# Patient Record
Sex: MALE | Race: BLACK OR AFRICAN AMERICAN | URBAN - METROPOLITAN AREA
[De-identification: names, ages, dates, MRNs, and addresses within clinical notes are randomized per-mention and may not be internally consistent; named-entity substitution may affect disease eponyms.]

---

## 2017-07-25 ENCOUNTER — HISTORICAL (OUTPATIENT)
Dept: ADMINISTRATIVE | Facility: HOSPITAL | Age: 57
End: 2017-07-25

## 2017-07-31 LAB
FINAL CULTURE: NORMAL
FINAL CULTURE: NORMAL

## 2017-10-29 LAB
COLOR STL: NORMAL
CONSISTENCY STL: NORMAL
HEMOCCULT SP1 STL QL: NEGATIVE

## 2017-10-30 LAB
COLOR STL: NORMAL
CONSISTENCY STL: NORMAL
HEMOCCULT SP2 STL QL: NEGATIVE

## 2017-10-31 LAB
COLOR STL: NORMAL
CONSISTENCY STL: NORMAL

## 2017-11-02 ENCOUNTER — HISTORICAL (OUTPATIENT)
Dept: INTERNAL MEDICINE | Facility: CLINIC | Age: 57
End: 2017-11-02

## 2018-05-21 ENCOUNTER — HISTORICAL (OUTPATIENT)
Dept: INTERNAL MEDICINE | Facility: CLINIC | Age: 58
End: 2018-05-21

## 2018-05-21 LAB
CHOLEST SERPL-MCNC: 189 MG/DL
CHOLEST/HDLC SERPL: 4 {RATIO} (ref 0–5)
EST. AVERAGE GLUCOSE BLD GHB EST-MCNC: 105 MG/DL
HBA1C MFR BLD: 5.3 % (ref 4.2–6.3)
HDLC SERPL-MCNC: 47 MG/DL
LDLC SERPL CALC-MCNC: 109 MG/DL (ref 0–130)
TRIGL SERPL-MCNC: 164 MG/DL
VLDLC SERPL CALC-MCNC: 33 MG/DL

## 2018-07-19 ENCOUNTER — HISTORICAL (OUTPATIENT)
Dept: INTERNAL MEDICINE | Facility: CLINIC | Age: 58
End: 2018-07-19

## 2018-07-27 ENCOUNTER — HISTORICAL (OUTPATIENT)
Dept: RADIOLOGY | Facility: HOSPITAL | Age: 58
End: 2018-07-27

## 2018-11-13 ENCOUNTER — HISTORICAL (OUTPATIENT)
Dept: ADMINISTRATIVE | Facility: HOSPITAL | Age: 58
End: 2018-11-13

## 2018-11-13 LAB
ALBUMIN SERPL-MCNC: 4 GM/DL (ref 3.4–5)
ALBUMIN/GLOB SERPL: 1 RATIO (ref 1–2)
ALP SERPL-CCNC: 135 UNIT/L (ref 45–117)
ALT SERPL-CCNC: 60 UNIT/L (ref 12–78)
AST SERPL-CCNC: 43 UNIT/L (ref 15–37)
BILIRUB SERPL-MCNC: 0.6 MG/DL (ref 0.2–1)
BILIRUBIN DIRECT+TOT PNL SERPL-MCNC: 0.2 MG/DL
BILIRUBIN DIRECT+TOT PNL SERPL-MCNC: 0.4 MG/DL
BUN SERPL-MCNC: 10 MG/DL (ref 7–18)
CALCIUM SERPL-MCNC: 8.9 MG/DL (ref 8.5–10.1)
CHLORIDE SERPL-SCNC: 106 MMOL/L (ref 98–107)
CHOLEST SERPL-MCNC: 180 MG/DL
CHOLEST/HDLC SERPL: 3.5 {RATIO} (ref 0–5)
CO2 SERPL-SCNC: 24 MMOL/L (ref 21–32)
CREAT SERPL-MCNC: 0.7 MG/DL (ref 0.6–1.3)
FERRITIN SERPL-MCNC: 1083.8 NG/ML (ref 26–388)
GLOBULIN SER-MCNC: 4.8 GM/ML (ref 2.3–3.5)
GLUCOSE SERPL-MCNC: 80 MG/DL (ref 74–106)
HDLC SERPL-MCNC: 52 MG/DL
INR PPP: 1.19 (ref 0.9–1.2)
IRON SATN MFR SERPL: 45.5 % (ref 15–50)
IRON SERPL-MCNC: 130 MCG/DL (ref 65–175)
LDLC SERPL CALC-MCNC: 107 MG/DL (ref 0–130)
POTASSIUM SERPL-SCNC: 4.1 MMOL/L (ref 3.5–5.1)
PROT SERPL-MCNC: 8.8 GM/DL (ref 6.4–8.2)
PROTHROMBIN TIME: 14.3 SECOND(S) (ref 11.9–14.4)
SODIUM SERPL-SCNC: 136 MMOL/L (ref 136–145)
TIBC SERPL-MCNC: 286 MCG/DL (ref 250–450)
TRANSFERRIN SERPL-MCNC: 224 MG/DL (ref 200–360)
TRIGL SERPL-MCNC: 107 MG/DL
VLDLC SERPL CALC-MCNC: 21 MG/DL

## 2019-07-25 ENCOUNTER — HISTORICAL (OUTPATIENT)
Dept: ADMINISTRATIVE | Facility: HOSPITAL | Age: 59
End: 2019-07-25

## 2019-07-25 LAB
ABS NEUT (OLG): 5.08 X10(3)/MCL (ref 2.1–9.2)
ALBUMIN SERPL-MCNC: 4.1 GM/DL (ref 3.4–5)
ALBUMIN/GLOB SERPL: 1 RATIO (ref 1.1–2)
ALP SERPL-CCNC: 90 UNIT/L (ref 45–117)
ALT SERPL-CCNC: 32 UNIT/L (ref 12–78)
AST SERPL-CCNC: 32 UNIT/L (ref 15–37)
BASOPHILS # BLD AUTO: 0.05 X10(3)/MCL
BASOPHILS NFR BLD AUTO: 0 %
BILIRUB SERPL-MCNC: 0.6 MG/DL (ref 0.2–1)
BILIRUBIN DIRECT+TOT PNL SERPL-MCNC: 0.2 MG/DL
BILIRUBIN DIRECT+TOT PNL SERPL-MCNC: 0.4 MG/DL
BUN SERPL-MCNC: 12 MG/DL (ref 7–18)
CALCIUM SERPL-MCNC: 9.6 MG/DL (ref 8.5–10.1)
CHLORIDE SERPL-SCNC: 105 MMOL/L (ref 98–107)
CO2 SERPL-SCNC: 29 MMOL/L (ref 21–32)
CREAT SERPL-MCNC: 0.7 MG/DL (ref 0.6–1.3)
EOSINOPHIL # BLD AUTO: 0.41 10*3/UL
EOSINOPHIL NFR BLD AUTO: 4 %
ERYTHROCYTE [DISTWIDTH] IN BLOOD BY AUTOMATED COUNT: 15.9 % (ref 11.5–14.5)
GLOBULIN SER-MCNC: 4.3 GM/ML (ref 2.3–3.5)
GLUCOSE SERPL-MCNC: 82 MG/DL (ref 74–106)
HCT VFR BLD AUTO: 42.1 % (ref 40–51)
HGB BLD-MCNC: 13 GM/DL (ref 13.5–17.5)
IMM GRANULOCYTES # BLD AUTO: 0.05 10*3/UL
IMM GRANULOCYTES NFR BLD AUTO: 0 %
INR PPP: 1.22 (ref 0.9–1.2)
LYMPHOCYTES # BLD AUTO: 3.48 X10(3)/MCL
LYMPHOCYTES NFR BLD AUTO: 36 % (ref 13–40)
MCH RBC QN AUTO: 22.8 PG (ref 26–34)
MCHC RBC AUTO-ENTMCNC: 30.9 GM/DL (ref 31–37)
MCV RBC AUTO: 74 FL (ref 80–100)
MONOCYTES # BLD AUTO: 0.68 X10(3)/MCL
MONOCYTES NFR BLD AUTO: 7 % (ref 0–24)
NEUTROPHILS # BLD AUTO: 5.08 X10(3)/MCL
NEUTROPHILS NFR BLD AUTO: 52 X10(3)/MCL
PLATELET # BLD AUTO: 208 X10(3)/MCL (ref 130–400)
PMV BLD AUTO: 10.6 FL (ref 7.4–10.4)
POTASSIUM SERPL-SCNC: 4.4 MMOL/L (ref 3.5–5.1)
PROT SERPL-MCNC: 8.4 GM/DL (ref 6.4–8.2)
PROTHROMBIN TIME: 15.3 SECOND(S) (ref 11.9–14.4)
RBC # BLD AUTO: 5.69 X10(6)/MCL (ref 4.5–5.9)
SODIUM SERPL-SCNC: 137 MMOL/L (ref 136–145)
WBC # SPEC AUTO: 9.8 X10(3)/MCL (ref 4.5–11)

## 2019-08-15 ENCOUNTER — HISTORICAL (OUTPATIENT)
Dept: RADIOLOGY | Facility: HOSPITAL | Age: 59
End: 2019-08-15

## 2019-11-11 ENCOUNTER — HISTORICAL (OUTPATIENT)
Dept: ADMINISTRATIVE | Facility: HOSPITAL | Age: 59
End: 2019-11-11

## 2019-12-02 ENCOUNTER — HISTORICAL (OUTPATIENT)
Dept: RADIOLOGY | Facility: HOSPITAL | Age: 59
End: 2019-12-02

## 2019-12-17 ENCOUNTER — HISTORICAL (OUTPATIENT)
Dept: CARDIOLOGY | Facility: HOSPITAL | Age: 59
End: 2019-12-17

## 2020-03-02 ENCOUNTER — HISTORICAL (OUTPATIENT)
Dept: INTERNAL MEDICINE | Facility: CLINIC | Age: 60
End: 2020-03-02

## 2020-03-02 LAB
APPEARANCE, UA: CLEAR
BACTERIA #/AREA URNS AUTO: ABNORMAL /HPF
BILIRUB UR QL STRIP: NEGATIVE
BUN SERPL-MCNC: 6 MG/DL (ref 7–18)
CALCIUM SERPL-MCNC: 9.2 MG/DL (ref 8.5–10.1)
CHLORIDE SERPL-SCNC: 109 MMOL/L (ref 98–107)
CHOLEST SERPL-MCNC: 153 MG/DL
CHOLEST/HDLC SERPL: 1.9 {RATIO} (ref 0–5)
CO2 SERPL-SCNC: 28 MMOL/L (ref 21–32)
COLOR UR: YELLOW
CREAT SERPL-MCNC: 0.9 MG/DL (ref 0.6–1.3)
CREAT/UREA NIT SERPL: 7
GLUCOSE (UA): NEGATIVE
GLUCOSE SERPL-MCNC: 103 MG/DL (ref 74–106)
HDLC SERPL-MCNC: 80 MG/DL (ref 40–59)
HGB UR QL STRIP: 0.03 MG/DL
HYALINE CASTS #/AREA URNS LPF: ABNORMAL /LPF
KETONES UR QL STRIP: NEGATIVE
LDLC SERPL CALC-MCNC: 58 MG/DL
LEUKOCYTE ESTERASE UR QL STRIP: NEGATIVE
NITRITE UR QL STRIP: NEGATIVE
PH UR STRIP: 5.5 [PH] (ref 4.5–8)
POTASSIUM SERPL-SCNC: 4.1 MMOL/L (ref 3.5–5.1)
PROT UR QL STRIP: NEGATIVE
RBC #/AREA URNS AUTO: ABNORMAL /HPF
SODIUM SERPL-SCNC: 140 MMOL/L (ref 136–145)
SP GR UR STRIP: 1.02 (ref 1–1.03)
SQUAMOUS #/AREA URNS LPF: ABNORMAL /LPF
TRIGL SERPL-MCNC: 74 MG/DL
UROBILINOGEN UR STRIP-ACNC: NORMAL
VLDLC SERPL CALC-MCNC: 15 MG/DL
WBC #/AREA URNS AUTO: ABNORMAL /HPF

## 2020-05-22 ENCOUNTER — HISTORICAL (OUTPATIENT)
Dept: INTERNAL MEDICINE | Facility: CLINIC | Age: 60
End: 2020-05-22

## 2020-05-22 LAB — PSA SERPL-MCNC: <0.1 NG/ML

## 2020-07-10 ENCOUNTER — HISTORICAL (OUTPATIENT)
Dept: ADMINISTRATIVE | Facility: HOSPITAL | Age: 60
End: 2020-07-10

## 2020-07-10 LAB
APPEARANCE, UA: CLEAR
BACTERIA #/AREA URNS AUTO: ABNORMAL /HPF
BILIRUB UR QL STRIP: NEGATIVE
COLOR UR: YELLOW
GLUCOSE (UA): NEGATIVE
HGB UR QL STRIP: 0.03 MG/DL
HYALINE CASTS #/AREA URNS LPF: ABNORMAL /LPF
KETONES UR QL STRIP: NEGATIVE
LEUKOCYTE ESTERASE UR QL STRIP: NEGATIVE
NITRITE UR QL STRIP: NEGATIVE
PH UR STRIP: 6 [PH] (ref 4.5–8)
PROT UR QL STRIP: 10 MG/DL
RBC #/AREA URNS AUTO: ABNORMAL /HPF
SP GR UR STRIP: 1.01 (ref 1–1.03)
SQUAMOUS #/AREA URNS LPF: ABNORMAL /LPF
UROBILINOGEN UR STRIP-ACNC: 2 MG/DL
WBC #/AREA URNS AUTO: ABNORMAL /HPF

## 2020-08-17 ENCOUNTER — HISTORICAL (OUTPATIENT)
Dept: RADIOLOGY | Facility: HOSPITAL | Age: 60
End: 2020-08-17

## 2020-09-15 ENCOUNTER — HISTORICAL (OUTPATIENT)
Dept: INTERNAL MEDICINE | Facility: CLINIC | Age: 60
End: 2020-09-15

## 2020-09-15 LAB
APPEARANCE, UA: NORMAL
BACTERIA #/AREA URNS AUTO: ABNORMAL /HPF
BILIRUB UR QL STRIP: NEGATIVE
COLOR UR: NORMAL
GLUCOSE (UA): NEGATIVE
HGB UR QL STRIP: NEGATIVE
HIV 1+2 AB+HIV1 P24 AG SERPL QL IA: NONREACTIVE
HYALINE CASTS #/AREA URNS LPF: 0 /LPF
KETONES UR QL STRIP: NEGATIVE
LEUKOCYTE ESTERASE UR QL STRIP: NEGATIVE
MUCOUS THREADS URNS QL MICRO: SLIGHT
NITRITE UR QL STRIP: NEGATIVE
PH UR STRIP: 5.5 [PH] (ref 4.5–8)
PROT UR QL STRIP: NEGATIVE
RBC #/AREA URNS AUTO: ABNORMAL /HPF
RPR SER QL: REACTIVE
SP GR UR STRIP: >1.03 (ref 1–1.03)
SQUAMOUS #/AREA URNS LPF: ABNORMAL /LPF
T PALLIDUM AB SER QL: REACTIVE
UROBILINOGEN UR STRIP-ACNC: NORMAL
WBC #/AREA URNS AUTO: ABNORMAL /HPF

## 2021-01-27 ENCOUNTER — HISTORICAL (OUTPATIENT)
Dept: INTERNAL MEDICINE | Facility: CLINIC | Age: 61
End: 2021-01-27

## 2021-01-27 LAB
ABS NEUT (OLG): 2.78 X10(3)/MCL (ref 2.1–9.2)
ALBUMIN SERPL-MCNC: 4.5 GM/DL (ref 3.5–5)
ALBUMIN/GLOB SERPL: 1.2 RATIO (ref 1.1–2)
ALP SERPL-CCNC: 89 UNIT/L (ref 40–150)
ALT SERPL-CCNC: 45 UNIT/L (ref 0–55)
AMMONIA PLAS-MSCNC: 52.5 UMOL/L (ref 18–72)
AST SERPL-CCNC: 95 UNIT/L (ref 5–34)
BASOPHILS # BLD AUTO: 0 X10(3)/MCL (ref 0–0.2)
BASOPHILS NFR BLD AUTO: 1 %
BILIRUB SERPL-MCNC: 0.8 MG/DL
BILIRUBIN DIRECT+TOT PNL SERPL-MCNC: 0.4 MG/DL (ref 0–0.5)
BILIRUBIN DIRECT+TOT PNL SERPL-MCNC: 0.4 MG/DL (ref 0–0.8)
BUN SERPL-MCNC: 5 MG/DL (ref 8.4–25.7)
CALCIUM SERPL-MCNC: 9.7 MG/DL (ref 8.4–10.2)
CHLORIDE SERPL-SCNC: 104 MMOL/L (ref 98–107)
CO2 SERPL-SCNC: 23 MMOL/L (ref 22–29)
CREAT SERPL-MCNC: 0.67 MG/DL (ref 0.73–1.18)
DEPRECATED CALCIDIOL+CALCIFEROL SERPL-MC: 9 NG/ML (ref 30–80)
EOSINOPHIL # BLD AUTO: 0.2 X10(3)/MCL (ref 0–0.9)
EOSINOPHIL NFR BLD AUTO: 3 %
ERYTHROCYTE [DISTWIDTH] IN BLOOD BY AUTOMATED COUNT: 18.8 % (ref 11.5–14.5)
GLOBULIN SER-MCNC: 3.9 GM/DL (ref 2.4–3.5)
GLUCOSE SERPL-MCNC: 95 MG/DL (ref 74–100)
HCT VFR BLD AUTO: 40.1 % (ref 40–51)
HGB BLD-MCNC: 13 GM/DL (ref 13.5–17.5)
IMM GRANULOCYTES # BLD AUTO: 0.07 10*3/UL
IMM GRANULOCYTES NFR BLD AUTO: 1 %
INR PPP: 1.23 (ref 0.9–1.2)
LYMPHOCYTES # BLD AUTO: 1.7 X10(3)/MCL (ref 0.6–4.6)
LYMPHOCYTES NFR BLD AUTO: 32 %
MCH RBC QN AUTO: 25.4 PG (ref 26–34)
MCHC RBC AUTO-ENTMCNC: 32.4 GM/DL (ref 31–37)
MCV RBC AUTO: 78.3 FL (ref 80–100)
MONOCYTES # BLD AUTO: 0.6 X10(3)/MCL (ref 0.1–1.3)
MONOCYTES NFR BLD AUTO: 10 %
NEUTROPHILS # BLD AUTO: 2.78 X10(3)/MCL (ref 2.1–9.2)
NEUTROPHILS NFR BLD AUTO: 52 %
PLATELET # BLD AUTO: 164 X10(3)/MCL (ref 130–400)
PMV BLD AUTO: 10 FL (ref 7.4–10.4)
POTASSIUM SERPL-SCNC: 3.3 MMOL/L (ref 3.5–5.1)
PROT SERPL-MCNC: 8.4 GM/DL (ref 6.4–8.3)
PROTHROMBIN TIME: 15.1 SECOND(S) (ref 11.9–14.4)
RBC # BLD AUTO: 5.12 X10(6)/MCL (ref 4.5–5.9)
SODIUM SERPL-SCNC: 141 MMOL/L (ref 136–145)
VIT B12 SERPL-MCNC: 1328 PG/ML (ref 213–816)
WBC # SPEC AUTO: 5.3 X10(3)/MCL (ref 4.5–11)

## 2021-05-25 ENCOUNTER — HISTORICAL (OUTPATIENT)
Dept: ADMINISTRATIVE | Facility: HOSPITAL | Age: 61
End: 2021-05-25

## 2021-05-25 LAB
ABS NEUT (OLG): 11.97 X10(3)/MCL (ref 2.1–9.2)
ALBUMIN SERPL-MCNC: NORMAL GM/DL (ref 3.4–4.8)
ALBUMIN/GLOB SERPL: NORMAL RATIO (ref 1.1–2)
ALP SERPL-CCNC: NORMAL UNIT/L (ref 40–150)
ALT SERPL-CCNC: NORMAL UNIT/L (ref 0–55)
AST SERPL-CCNC: NORMAL UNIT/L (ref 5–34)
BASOPHILS # BLD AUTO: 0.1 X10(3)/MCL (ref 0–0.2)
BASOPHILS NFR BLD AUTO: 0 %
BILIRUB SERPL-MCNC: NORMAL MG/DL
BILIRUBIN DIRECT+TOT PNL SERPL-MCNC: NORMAL MG/DL (ref 0–0.5)
BILIRUBIN DIRECT+TOT PNL SERPL-MCNC: NORMAL MG/DL (ref 0–0.8)
BUN SERPL-MCNC: NORMAL MG/DL (ref 8.4–25.7)
CALCIUM SERPL-MCNC: NORMAL MG/DL (ref 8.8–10)
CHLORIDE SERPL-SCNC: NORMAL MMOL/L (ref 98–107)
CHOLEST SERPL-MCNC: NORMAL MG/DL
CHOLEST/HDLC SERPL: NORMAL {RATIO} (ref 0–5)
CO2 SERPL-SCNC: NORMAL MMOL/L (ref 23–31)
CREAT SERPL-MCNC: NORMAL MG/DL (ref 0.73–1.18)
DEPRECATED CALCIDIOL+CALCIFEROL SERPL-MC: NORMAL NG/ML (ref 30–80)
EOSINOPHIL # BLD AUTO: 0.2 X10(3)/MCL (ref 0–0.9)
EOSINOPHIL NFR BLD AUTO: 1 %
ERYTHROCYTE [DISTWIDTH] IN BLOOD BY AUTOMATED COUNT: 15.9 % (ref 11.5–14.5)
GLOBULIN SER-MCNC: NORMAL GM/DL (ref 2.4–3.5)
GLUCOSE SERPL-MCNC: NORMAL MG/DL (ref 82–115)
HCT VFR BLD AUTO: 39.6 % (ref 40–51)
HDLC SERPL-MCNC: NORMAL MG/DL (ref 35–60)
HGB BLD-MCNC: 13 GM/DL (ref 13.5–17.5)
IMM GRANULOCYTES # BLD AUTO: 0.18 10*3/UL
IMM GRANULOCYTES NFR BLD AUTO: 1 %
INR PPP: 1.22 (ref 0.9–1.2)
LDLC SERPL CALC-MCNC: NORMAL MG/DL (ref 50–140)
LYMPHOCYTES # BLD AUTO: 3.2 X10(3)/MCL (ref 0.6–4.6)
LYMPHOCYTES NFR BLD AUTO: 19 %
MCH RBC QN AUTO: 25.2 PG (ref 26–34)
MCHC RBC AUTO-ENTMCNC: 32.8 GM/DL (ref 31–37)
MCV RBC AUTO: 76.9 FL (ref 80–100)
MONOCYTES # BLD AUTO: 1.2 X10(3)/MCL (ref 0.1–1.3)
MONOCYTES NFR BLD AUTO: 7 %
NEUTROPHILS # BLD AUTO: 11.97 X10(3)/MCL (ref 2.1–9.2)
NEUTROPHILS NFR BLD AUTO: 72 %
NRBC BLD AUTO-RTO: 0 % (ref 0–0.2)
PLATELET # BLD AUTO: 237 X10(3)/MCL (ref 130–400)
PMV BLD AUTO: 9.3 FL (ref 7.4–10.4)
POTASSIUM SERPL-SCNC: NORMAL MMOL/L (ref 3.5–5.1)
PROT SERPL-MCNC: NORMAL GM/DL (ref 5.8–7.6)
PROTHROMBIN TIME: 15.2 SECOND(S) (ref 11.9–14.4)
RBC # BLD AUTO: 5.15 X10(6)/MCL (ref 4.5–5.9)
SODIUM SERPL-SCNC: NORMAL MMOL/L (ref 136–145)
TRIGL SERPL-MCNC: NORMAL MG/DL (ref 34–140)
VLDLC SERPL CALC-MCNC: NORMAL MG/DL
WBC # SPEC AUTO: 16.7 X10(3)/MCL (ref 4.5–11)

## 2021-12-28 ENCOUNTER — HISTORICAL (OUTPATIENT)
Dept: CARDIOLOGY | Facility: HOSPITAL | Age: 61
End: 2021-12-28

## 2022-04-10 ENCOUNTER — HISTORICAL (OUTPATIENT)
Dept: ADMINISTRATIVE | Facility: HOSPITAL | Age: 62
End: 2022-04-10
Payer: MEDICARE

## 2022-04-27 VITALS
HEIGHT: 69 IN | BODY MASS INDEX: 25.8 KG/M2 | WEIGHT: 174.19 LBS | OXYGEN SATURATION: 99 % | SYSTOLIC BLOOD PRESSURE: 109 MMHG | DIASTOLIC BLOOD PRESSURE: 68 MMHG

## 2022-05-04 NOTE — HISTORICAL [facility] CERNER
This is a historical note converted from [facility]. Formatting and pictures may have been removed.  Please reference [employer] for original formatting and attached multimedia. Chief Complaint  Pt here for a follow up and med refills.  History of Present Illness  59 year old  male here for f/u visit. H/o cirrhosis originally diagnosed in [place] in 2016; also in 2016?underwent EGD revealing duodenal varices, antral ulcers, portal HTN gastropathy and was treated w/ fluconazole for candidal esophagitis. Last seen in June 2018; remains on aldactone for cirrhosis. Hep C negative. Has been discussed to refer to [facility] for liver [facility] but so far patient has been refusing. Patient was admitted to [facility] for new onset status epilepticus in July 2017, no prior h/o seizures. Currently on keppra and vimpat and no seizures since discharge; seen by neuro who said patient may have had stroke but could not do MRI due to metal plates. Patient said used to get paracentesis at our procedure clinic and has been admitted for HE but for past year or so has been doing well with only occasional LE swelling.  ?   BP better today. Has not done any of the things for acute complaints last visit, still complaining of back pain radiating to R leg. Did not do lumbar xr. Still has itching skin. Compliant with all medications. Other new complaint today is urinary frequency and occasional incontinence. Does not feel that he is completely emptying his bladder. Denies stool incontinence or cauda equina symptoms.  Review of Systems  Gen: AOx3,?No fever, No weight changes  Eye: No blindness  Heart:?No chest pains, No palpitations,?No diaphoresis  Lungs:?No SOB, No wheezing  GI: No abd pain,?No Nausea, No vomiting  : No hematuria, No dysuria  Musk: No LE swelling. +Back pain  Integumentary: No rash, + LLE pruritus  Neuro: Normal speech, No focal weakness, No headache  Physical Exam  Vitals & Measurements  T:?36.6? ?C (Oral)?  HR:?57(Peripheral)? RR:?18? BP:?138/77?  HT:?171?cm? WT:?86.6?kg? BMI:?29.62?  Gen: No acute distress, afebrile  HEENT: Normocephalic, No scleral icterus, Oral mucosa moist.??  Chest: CTAB  Heart: S1, S2, no appreciable murmur,?regular rate and rhythm  Abd: BS+, soft, non tender?no ascites.  Extremity: warm, no LE edema.? Some hyperpigmented?rash noted on lateral aspect of left leg, improved.? No purulence, not warm to touch, no erythema. ?Consistent with contact dermatitis.  Neurologic: alert and oriented x 3, moving all extremity with good strength  MSK: normal musculature, no clubbing or cyanosis  Assessment/Plan  Lumbar Radiculopathy  -XR Lumbar spine re-ordered, not done last time  -Will give Rx for outpatient PT if needed  -May need MRI  ?   Cerumen impaction  -Prescribed carbamide oxide otic drops again, did not  from pharmacy last time  ?   Contact dermatitis  -Trial of hydrocortisone cream 1% topical re-ordered, did not  last time  ?   Urinary Frequency  -Ordered UA  -If normal, will do trial of flomax. If no improvement, will order PSA  ?   1. Cirrhosis  MELD 12, Child Orta class B, 8  Diagnosed initially in [place] in 2016  Seen by GI while admitted to [facility] in July 2017  US abdomen in 2017 did not reveal cirrhotic liver  Has been on Aldactone 25mg daily and PRN lasix, this is not optimal dose in the setting of cirrhosis. Currently no ascites, takes lasix 2x per week on average. D/cd aldactone last visit but has continued to take it  Has 3-4 BMs regularly w/ lactulose  Hep panel negative  Said has not had alcohol in >1 year  US abdomen done in July 2018 consistent with Liver cirrhosis, repeat US ordered today  Pending appt?in GI clinic here for further management  ?   Esophageal varices  - continue propranolol. Last EGD in June 2016 here  - Referred to [facility] for repeat screening EGD  ?   2. HTN  - BP continues to be elevated, increase Lisinopril to?40 mg daily  - Not  taking HCTZ, has continued spirinolactone.  - Controlled today so continue Lisinopril and aldactone today, ordered BMP to watch for potassium levels  - Encouraged pt to maintain BP log at home  ?   3. HLD?  - continue statin,   ?  4. Seizures, h/o status epilepticus  -?seizure free since d/c  - continue vimpat and keppra as per Neuro Clinic, will need to stay on medication for at least 2 years  - saw neuro, said possible stroke but could not get MRI due to metal plates in arm. ok to continue ASA 81 mg daily  - F/u appt scheduled for the end of this month  ?  ?  Preventive  - PPSV23 in august 2016  - Stool test negative 7/18/18, repeat ordered today  - Low dose CT reviewed, done in August 2019. Repeat 1 year.  - ordered CMP, CBC, and INR today  - Flu vaccine today  - Tetanus today  ?  RTC in?4 months   Problem List/Past Medical History  Ongoing  Alcoholic hepatitis  Cirrhosis, alcoholic  Epilepsy  ETOH abuse  Hepatic encephalopathy  HTN (hypertension)  Hypokalaemia  Hypolipidaemia  Obesity  Tobacco user  Historical  Coagulopathy  Hyperbilirubinemia  Hyponatraemia  Procedure/Surgical History  Removal impacted cerumen using irrigation/lavage, unilateral (08/31/2017)  Esophagogastroduodenoscopy (07/26/2016)  Esophagogastroduodenoscopy, flexible, transoral; diagnostic, including collection of specimen(s) by brushing or washing, when performed (separate procedure) (07/26/2016)  Inspection of Upper Intestinal Tract, Via Natural or Artificial Opening Endoscopic (07/26/2016)  Procedure Cancelled Intraoperatively (07/22/2016)  carpal tunnel release right hand  plate in Left forearm  Reconstruction of radial forearm with free flap   Medications  aspirin 81 mg oral Delayed Release (EC) tablet, 81 mg= 1 tab(s), Oral, Daily,? ?Not taking: duplicate  aspirin 81 mg oral tablet, 81 mg= 1 tab(s), Oral, Daily, 8 refills  Blood Pressure Cuff, See Instructions  Boostrix (Tdap) intramuscular suspension, 0.5 mL, IM,  Once-Unscheduled  ferrous sulfate 325 mg (65 mg elemental iron) oral tablet, 325 mg= 1 tab(s), Oral, BID  hydrocortisone 1% topical gel, 1 tyler, TOP, BID,? ?Not taking  influenza virus vaccine, inactivated quadrivalent intramuscular suspension, 0.5 mL, IM, Once  lacosamide 150 mg oral tablet, 150 mg= 1 tab(s), Oral, BID, 2 refills  lactulose 10 g/15 mL oral syrup, 20 gm, Oral, Daily, 8 refills,? ?Not taking  Lasix 20 mg oral tablet, 20 mg= 1 tab(s), Oral, Daily, PRN, 4 refills  levetiracetam 750 mg oral tablet, 1500 mg= 2 tab(s), Oral, BID, 6 refills  lisinopril 40 mg oral tablet, 40 mg= 1 tab(s), Oral, Daily, 8 refills  multivitamin with minerals (Adult Tab), 1 tab(s), Oral, Daily,? ?Not taking  Pravastatin 40 mg Oral Tab, 40 mg= 1 tab(s), Oral, Daily, 5 refills  propranolol 10 mg oral tablet, 10 mg= 1 tab(s), Oral, BID, 8 refills  spironolactone 25 mg oral tablet, 25 mg= 1 tab(s), Oral, Daily  thiamine 100 mg oral tablet, 100 mg= 1 tab(s), Oral, Daily, 4 refills  Zantac 150 oral tablet, 150 mg= 1 tab(s), Oral, BID, PRN, 5 refills  Allergies  No Known Allergies  Social History  Abuse/Neglect  No, 07/25/2019  Alcohol  Past, Beer, Liquor, 04/05/2017  Employment/School  Unemployed, Highest education level: High school., 07/12/2016  Exercise  Exercise type: Walking, Bicycle., 11/09/2018  Home/Environment  Lives with Alone. Living situation: Home with assistance. Home equipment: Walker/Cane. Alcohol abuse in household: No. Substance abuse in household: No. Smoker in household: Yes. Injuries/Abuse/Neglect in household: No. Feels unsafe at home: No. Safe place to go: Yes. Family/Friends available for support: Yes. Concern for family members at home: Yes. Major illness in household: Yes. TV/Computer concerns: No., 11/09/2018  Nutrition/Health  Regular, 08/02/2016  Substance Use  Past, Marijuana, 11/09/2018  Tobacco  10 or more cigarettes (1/2 pack or more)/day in last 30 days, Cigarettes, No, Ready to change: No. 20 per  day., 05/24/2019  Family History  Cancer: Grandmother.  Cancer: Mother and Father.  Cardiac disease: Mother and Father.  Diabetes mellitus type 2: Mother and Brother.  Hypertension.: Sister.  Immunizations  Vaccine Date Status   influenza virus vaccine, inactivated 11/13/2018 Given   influenza virus vaccine, inactivated 01/03/2018 Given   pneumococcal 23-polyvalent vaccine 08/02/2016 Given   Health Maintenance  Health Maintenance  ???Pending?(in the next year)  ??? ??OverDue  ??? ? ? ?Diabetes Screening due??and every?  ??? ? ? ?Alcohol Misuse Screening due??01/01/19??and every 1??year(s)  ??? ? ? ?Smoking Cessation due??01/01/19??and every 1??year(s)  ??? ? ? ?Colorectal Screening due??07/18/19??and every 1??year(s)  ??? ??Due?  ??? ? ? ?Influenza Vaccine due??11/11/19??and every?  ??? ? ? ?Tetanus Vaccine due??11/11/19??and every 10??year(s)  ??? ??Due In Future?  ??? ? ? ?Obesity Screening not due until??01/01/20??and every 1??year(s)  ??? ? ? ?Depression Screening not due until??05/23/20??and every 1??year(s)  ??? ? ? ?ADL Screening not due until??05/24/20??and every 1??year(s)  ??? ? ? ?Hypertension Management-BMP not due until??07/24/20??and every 1??year(s)  ??? ? ? ?Lung Cancer Screening not due until??08/15/20??and every 1??year(s)  ??? ? ? ?Blood Pressure Screening not due until??11/10/20??and every 1??year(s)  ??? ? ? ?Body Mass Index Check not due until??11/10/20??and every 1??year(s)  ??? ? ? ?Hypertension Management-Blood Pressure not due until??11/10/20??and every 1??year(s)  ???Satisfied?(in the past 1 year)  ??? ??Satisfied?  ??? ? ? ?ADL Screening on??05/24/19.??Satisfied by Izabela Terrazas LPN  ??? ? ? ?Aspirin Therapy for CVD Prevention on??11/11/19.  ??? ? ? ?Blood Pressure Screening on??11/11/19.??Satisfied by Mary Ortega LPN  ??? ? ? ?Body Mass Index Check on??11/11/19.??Satisfied by Mary Ortega LPN  ??? ? ? ?Depression Screening on??05/24/19.??Satisfied by Mk CRUZ,  Izabela Ruiz  ??? ? ? ?Diabetes Screening on??07/25/19.??Satisfied by Mignon Fallon  ??? ? ? ?Hypertension Management-Blood Pressure on??11/11/19.??Satisfied by Mary Ortega LPN.  ??? ? ? ?Influenza Vaccine on??11/11/19.??Satisfied by Michael Sheriff MD  ??? ? ? ?Lipid Screening on??11/13/18.??Satisfied by Sheri Baumann  ??? ? ? ?Lung Cancer Screening on??08/15/19.??Satisfied by Jane Rivas RT, Perico FLANNERY.  ??? ? ? ?Obesity Screening on??11/11/19.??Satisfied by Mary Ortega LPN  ?      Reviewed present illness, physical exam, assessment/plan of resident. Case discussed with the resident. Care provided is reasonable and necessary.